# Patient Record
Sex: MALE | Race: WHITE | URBAN - METROPOLITAN AREA
[De-identification: names, ages, dates, MRNs, and addresses within clinical notes are randomized per-mention and may not be internally consistent; named-entity substitution may affect disease eponyms.]

---

## 2018-12-21 ENCOUNTER — APPOINTMENT (OUTPATIENT)
Dept: URBAN - METROPOLITAN AREA SURGERY 9 | Age: 43
Setting detail: DERMATOLOGY
End: 2018-12-21

## 2018-12-21 VITALS — SYSTOLIC BLOOD PRESSURE: 120 MMHG | RESPIRATION RATE: 14 BRPM | HEART RATE: 57 BPM | DIASTOLIC BLOOD PRESSURE: 82 MMHG

## 2018-12-21 PROBLEM — C44.321 SQUAMOUS CELL CARCINOMA OF SKIN OF NOSE: Status: ACTIVE | Noted: 2018-12-21

## 2018-12-21 PROBLEM — Z85.828 PERSONAL HISTORY OF OTHER MALIGNANT NEOPLASM OF SKIN: Status: ACTIVE | Noted: 2018-12-21

## 2018-12-21 PROCEDURE — OTHER MOHS SURGERY: OTHER

## 2018-12-21 PROCEDURE — OTHER RETURN TO REFERRING PROVIDER: OTHER

## 2018-12-21 PROCEDURE — OTHER CONSULTATION FOR MOHS SURGERY: OTHER

## 2018-12-21 PROCEDURE — 17311 MOHS 1 STAGE H/N/HF/G: CPT

## 2018-12-21 PROCEDURE — 15260 FTH/GFT FR N/E/E/L 20 SQCM/<: CPT

## 2018-12-21 PROCEDURE — OTHER PRESCRIPTION: OTHER

## 2018-12-21 PROCEDURE — 17312 MOHS ADDL STAGE: CPT

## 2018-12-21 RX ORDER — MUPIROCIN 20 MG/G
OINTMENT TOPICAL
Qty: 1 | Refills: 0 | Status: ERX | COMMUNITY
Start: 2018-12-21

## 2018-12-21 NOTE — PROCEDURE: MOHS SURGERY
Advancement Flap (Single) Text: The defect edges were debeveled with a #15 scalpel blade.  Given the location of the defect and the proximity to free margins a single advancement flap was deemed most appropriate.  Using a sterile surgical marker, an appropriate advancement flap was drawn incorporating the defect and placing the expected incisions within the relaxed skin tension lines where possible.    The area thus outlined was incised deep to adipose tissue with a #15 scalpel blade.  The skin margins were undermined to an appropriate distance in all directions utilizing iris scissors. Location Indication Override (Is Already Calculated Based On Selected Body Location): Area H

## 2018-12-21 NOTE — PROCEDURE: MOHS SURGERY
Body Location Override (Optional - Billing Will Still Be Based On Selected Body Map Location If Applicable): right alar rim

## 2018-12-21 NOTE — PROCEDURE: CONSULTATION FOR MOHS SURGERY
X Size Of Lesion In Cm (Optional): 0
Body Location Override (Optional - Billing Will Still Be Based On Selected Body Map Location If Applicable): right alar rim
Incorporate Mauc In Note: Yes
Name Of The Referring Provider For Procedure: Dr. Catalan
Detail Level: Detailed

## 2018-12-31 ENCOUNTER — APPOINTMENT (OUTPATIENT)
Dept: URBAN - METROPOLITAN AREA SURGERY 9 | Age: 43
Setting detail: DERMATOLOGY
End: 2018-12-31

## 2018-12-31 DIAGNOSIS — Z48.02 ENCOUNTER FOR REMOVAL OF SUTURES: ICD-10-CM

## 2018-12-31 PROCEDURE — OTHER SUTURE REMOVAL (GLOBAL PERIOD): OTHER

## 2018-12-31 PROCEDURE — 99024 POSTOP FOLLOW-UP VISIT: CPT

## 2018-12-31 ASSESSMENT — LOCATION ZONE DERM: LOCATION ZONE: NOSE

## 2018-12-31 ASSESSMENT — LOCATION SIMPLE DESCRIPTION DERM: LOCATION SIMPLE: NOSE

## 2018-12-31 ASSESSMENT — LOCATION DETAILED DESCRIPTION DERM: LOCATION DETAILED: NASAL TIP

## 2018-12-31 NOTE — PROCEDURE: SUTURE REMOVAL (GLOBAL PERIOD)
Detail Level: Zone
Body Location Override (Optional - Billing Will Still Be Based On Selected Body Map Location If Applicable): right alar rim
Add 96982 Cpt? (Important Note: In 2017 The Use Of 43731 Is Being Tracked By Cms To Determine Future Global Period Reimbursement For Global Periods): yes

## 2022-11-27 NOTE — PROCEDURE: MOHS SURGERY
CERTIFICATE OF SCHOOL/SPORTS    11/27/2022      Re: Jian Mann   [address] [facility] [id number]      This is to certify that Jian Mann has been under my care from 11/27/2022 and excused from school/swim until fever free for 24 hours     RESTRICTIONS:       REMARKS:    JAZZY Gonzalez  [address] [facility] [address]         Bilateral Helical Rim Advancement Flap Text: The defect edges were debeveled with a #15 blade scalpel.  Given the location of the defect and the proximity to free margins (helical rim) a bilateral helical rim advancement flap was deemed most appropriate.  Using a sterile surgical marker, the appropriate advancement flaps were drawn incorporating the defect and placing the expected incisions between the helical rim and antihelix where possible.  The area thus outlined was incised through and through with a #15 scalpel blade.  With a skin hook and iris scissors, the flaps were gently and sharply undermined and freed up.